# Patient Record
Sex: FEMALE | Race: WHITE | NOT HISPANIC OR LATINO
[De-identification: names, ages, dates, MRNs, and addresses within clinical notes are randomized per-mention and may not be internally consistent; named-entity substitution may affect disease eponyms.]

---

## 2017-12-22 ENCOUNTER — OTHER (OUTPATIENT)
Age: 52
End: 2017-12-22

## 2017-12-29 ENCOUNTER — APPOINTMENT (OUTPATIENT)
Dept: ORTHOPEDIC SURGERY | Facility: CLINIC | Age: 52
End: 2017-12-29
Payer: COMMERCIAL

## 2017-12-29 VITALS
WEIGHT: 190 LBS | HEIGHT: 66 IN | SYSTOLIC BLOOD PRESSURE: 130 MMHG | TEMPERATURE: 98.5 F | BODY MASS INDEX: 30.53 KG/M2 | HEART RATE: 63 BPM | DIASTOLIC BLOOD PRESSURE: 81 MMHG

## 2017-12-29 DIAGNOSIS — Z87.39 PERSONAL HISTORY OF OTHER DISEASES OF THE MUSCULOSKELETAL SYSTEM AND CONNECTIVE TISSUE: ICD-10-CM

## 2017-12-29 PROCEDURE — 99204 OFFICE O/P NEW MOD 45 MIN: CPT

## 2017-12-29 PROCEDURE — 73140 X-RAY EXAM OF FINGER(S): CPT | Mod: F5

## 2018-01-24 ENCOUNTER — APPOINTMENT (OUTPATIENT)
Dept: NEUROLOGY | Facility: CLINIC | Age: 53
End: 2018-01-24
Payer: COMMERCIAL

## 2018-01-24 PROCEDURE — 95886 MUSC TEST DONE W/N TEST COMP: CPT

## 2018-01-24 PROCEDURE — 95909 NRV CNDJ TST 5-6 STUDIES: CPT

## 2018-02-12 ENCOUNTER — OUTPATIENT (OUTPATIENT)
Dept: OUTPATIENT SERVICES | Facility: HOSPITAL | Age: 53
LOS: 1 days | End: 2018-02-12
Payer: COMMERCIAL

## 2018-02-12 VITALS
SYSTOLIC BLOOD PRESSURE: 122 MMHG | DIASTOLIC BLOOD PRESSURE: 72 MMHG | HEIGHT: 66 IN | HEART RATE: 53 BPM | RESPIRATION RATE: 16 BRPM | TEMPERATURE: 98 F | WEIGHT: 196.21 LBS

## 2018-02-12 DIAGNOSIS — Z98.890 OTHER SPECIFIED POSTPROCEDURAL STATES: Chronic | ICD-10-CM

## 2018-02-12 DIAGNOSIS — Z01.818 ENCOUNTER FOR OTHER PREPROCEDURAL EXAMINATION: ICD-10-CM

## 2018-02-12 DIAGNOSIS — G56.01 CARPAL TUNNEL SYNDROME, RIGHT UPPER LIMB: Chronic | ICD-10-CM

## 2018-02-12 DIAGNOSIS — I10 ESSENTIAL (PRIMARY) HYPERTENSION: ICD-10-CM

## 2018-02-12 DIAGNOSIS — G56.02 CARPAL TUNNEL SYNDROME, LEFT UPPER LIMB: ICD-10-CM

## 2018-02-12 DIAGNOSIS — M19.90 UNSPECIFIED OSTEOARTHRITIS, UNSPECIFIED SITE: ICD-10-CM

## 2018-02-12 DIAGNOSIS — Z98.891 HISTORY OF UTERINE SCAR FROM PREVIOUS SURGERY: Chronic | ICD-10-CM

## 2018-02-12 LAB
ANION GAP SERPL CALC-SCNC: 11 MMOL/L — SIGNIFICANT CHANGE UP (ref 5–17)
BASOPHILS # BLD AUTO: 0 K/UL — SIGNIFICANT CHANGE UP (ref 0–0.2)
BASOPHILS NFR BLD AUTO: 0.8 % — SIGNIFICANT CHANGE UP (ref 0–2)
BUN SERPL-MCNC: 15 MG/DL — SIGNIFICANT CHANGE UP (ref 8–20)
CALCIUM SERPL-MCNC: 9.5 MG/DL — SIGNIFICANT CHANGE UP (ref 8.6–10.2)
CHLORIDE SERPL-SCNC: 103 MMOL/L — SIGNIFICANT CHANGE UP (ref 98–107)
CO2 SERPL-SCNC: 28 MMOL/L — SIGNIFICANT CHANGE UP (ref 22–29)
CREAT SERPL-MCNC: 0.77 MG/DL — SIGNIFICANT CHANGE UP (ref 0.5–1.3)
EOSINOPHIL # BLD AUTO: 0.2 K/UL — SIGNIFICANT CHANGE UP (ref 0–0.5)
EOSINOPHIL NFR BLD AUTO: 3.4 % — SIGNIFICANT CHANGE UP (ref 0–6)
GLUCOSE SERPL-MCNC: 106 MG/DL — SIGNIFICANT CHANGE UP (ref 70–115)
HCT VFR BLD CALC: 41.9 % — SIGNIFICANT CHANGE UP (ref 37–47)
HGB BLD-MCNC: 13.5 G/DL — SIGNIFICANT CHANGE UP (ref 12–16)
LYMPHOCYTES # BLD AUTO: 1.6 K/UL — SIGNIFICANT CHANGE UP (ref 1–4.8)
LYMPHOCYTES # BLD AUTO: 25.6 % — SIGNIFICANT CHANGE UP (ref 20–55)
MCHC RBC-ENTMCNC: 27.9 PG — SIGNIFICANT CHANGE UP (ref 27–31)
MCHC RBC-ENTMCNC: 32.2 G/DL — SIGNIFICANT CHANGE UP (ref 32–36)
MCV RBC AUTO: 86.6 FL — SIGNIFICANT CHANGE UP (ref 81–99)
MONOCYTES # BLD AUTO: 0.5 K/UL — SIGNIFICANT CHANGE UP (ref 0–0.8)
MONOCYTES NFR BLD AUTO: 7.3 % — SIGNIFICANT CHANGE UP (ref 3–10)
NEUTROPHILS # BLD AUTO: 4 K/UL — SIGNIFICANT CHANGE UP (ref 1.8–8)
NEUTROPHILS NFR BLD AUTO: 62.7 % — SIGNIFICANT CHANGE UP (ref 37–73)
PLATELET # BLD AUTO: 235 K/UL — SIGNIFICANT CHANGE UP (ref 150–400)
POTASSIUM SERPL-MCNC: 4 MMOL/L — SIGNIFICANT CHANGE UP (ref 3.5–5.3)
POTASSIUM SERPL-SCNC: 4 MMOL/L — SIGNIFICANT CHANGE UP (ref 3.5–5.3)
RBC # BLD: 4.84 M/UL — SIGNIFICANT CHANGE UP (ref 4.4–5.2)
RBC # FLD: 13.2 % — SIGNIFICANT CHANGE UP (ref 11–15.6)
SODIUM SERPL-SCNC: 142 MMOL/L — SIGNIFICANT CHANGE UP (ref 135–145)
WBC # BLD: 6.4 K/UL — SIGNIFICANT CHANGE UP (ref 4.8–10.8)
WBC # FLD AUTO: 6.4 K/UL — SIGNIFICANT CHANGE UP (ref 4.8–10.8)

## 2018-02-12 PROCEDURE — 93005 ELECTROCARDIOGRAM TRACING: CPT

## 2018-02-12 PROCEDURE — 80048 BASIC METABOLIC PNL TOTAL CA: CPT

## 2018-02-12 PROCEDURE — 36415 COLL VENOUS BLD VENIPUNCTURE: CPT

## 2018-02-12 PROCEDURE — G0463: CPT

## 2018-02-12 PROCEDURE — 93010 ELECTROCARDIOGRAM REPORT: CPT

## 2018-02-12 PROCEDURE — 85027 COMPLETE CBC AUTOMATED: CPT

## 2018-02-12 RX ORDER — SODIUM CHLORIDE 9 MG/ML
3 INJECTION INTRAMUSCULAR; INTRAVENOUS; SUBCUTANEOUS ONCE
Qty: 0 | Refills: 0 | Status: DISCONTINUED | OUTPATIENT
Start: 2018-02-21 | End: 2018-03-08

## 2018-02-12 NOTE — H&P PST ADULT - ATTENDING COMMENTS
CKD (chronic kidney disease) stage 2, GFR 60-89 ml/min
Ulcer of right ankle, with necrosis of muscle
Left CTR

## 2018-02-12 NOTE — H&P PST ADULT - PSH
Acute carpal tunnel syndrome of right wrist  release  History of   ,  History of cholecystectomy   Acute carpal tunnel syndrome of right wrist  release  H/O umbilical hernia repair  2017  History of appendectomy    History of arthroscopy  2017  History of   ,  History of cholecystectomy    History of left oophorectomy

## 2018-02-12 NOTE — H&P PST ADULT - ASSESSMENT
Pt is a 52 y.o. female with PMH of HTN undergoing a release left carpal tunnel. Instructed to hold any medications containing ibuprofen and aspirin.

## 2018-02-12 NOTE — H&P PST ADULT - NSANTHOSAYNRD_GEN_A_CORE
No. KENDELL screening performed.  STOP BANG Legend: 0-2 = LOW Risk; 3-4 = INTERMEDIATE Risk; 5-8 = HIGH Risk

## 2018-02-12 NOTE — H&P PST ADULT - FAMILY HISTORY
Mother  Still living? Yes, Estimated age: 61-70  Family history of heart disease, Age at diagnosis: 61-70  Family history of diabetes mellitus in mother, Age at diagnosis: 61-70

## 2018-02-12 NOTE — ASU PATIENT PROFILE, ADULT - LEARNING ASSESSMENT (PATIENT) ADDITIONAL COMMENTS
pre op teaching pain managemewnt instructions given pre op teaching pain management instructions given  surgical scrub not given as pt said she developed a rash the last time she used it.

## 2018-02-12 NOTE — H&P PST ADULT - PMH
Carpal tunnel syndrome on both sides    Gallstones    Hypertension    Osteoarthritis  bilateral hands and knees  Raynaud's phenomenon    Umbilical hernia Carpal tunnel syndrome on both sides    Gallstones    Hypertension    Mitral valve prolapse    Osteoarthritis  bilateral hands and knees  Raynaud's phenomenon    Umbilical hernia

## 2018-02-12 NOTE — ASU PATIENT PROFILE, ADULT - PMH
Carpal tunnel syndrome on both sides    Gallstones    Hypertension    Osteoarthritis  bilateral hands and knees  Raynaud's phenomenon    Umbilical hernia

## 2018-02-15 ENCOUNTER — RX RENEWAL (OUTPATIENT)
Age: 53
End: 2018-02-15

## 2018-02-21 ENCOUNTER — APPOINTMENT (OUTPATIENT)
Dept: ORTHOPEDIC SURGERY | Facility: HOSPITAL | Age: 53
End: 2018-02-21

## 2018-02-21 ENCOUNTER — OUTPATIENT (OUTPATIENT)
Dept: OUTPATIENT SERVICES | Facility: HOSPITAL | Age: 53
LOS: 1 days | End: 2018-02-21
Payer: COMMERCIAL

## 2018-02-21 ENCOUNTER — TRANSCRIPTION ENCOUNTER (OUTPATIENT)
Age: 53
End: 2018-02-21

## 2018-02-21 VITALS
HEART RATE: 51 BPM | TEMPERATURE: 97 F | RESPIRATION RATE: 18 BRPM | OXYGEN SATURATION: 98 % | DIASTOLIC BLOOD PRESSURE: 51 MMHG | SYSTOLIC BLOOD PRESSURE: 112 MMHG

## 2018-02-21 VITALS
HEART RATE: 60 BPM | DIASTOLIC BLOOD PRESSURE: 68 MMHG | SYSTOLIC BLOOD PRESSURE: 127 MMHG | TEMPERATURE: 98 F | RESPIRATION RATE: 16 BRPM | WEIGHT: 192.9 LBS | OXYGEN SATURATION: 100 % | HEIGHT: 66 IN

## 2018-02-21 DIAGNOSIS — Z98.890 OTHER SPECIFIED POSTPROCEDURAL STATES: Chronic | ICD-10-CM

## 2018-02-21 DIAGNOSIS — G56.01 CARPAL TUNNEL SYNDROME, RIGHT UPPER LIMB: Chronic | ICD-10-CM

## 2018-02-21 DIAGNOSIS — G56.02 CARPAL TUNNEL SYNDROME, LEFT UPPER LIMB: ICD-10-CM

## 2018-02-21 DIAGNOSIS — Z98.891 HISTORY OF UTERINE SCAR FROM PREVIOUS SURGERY: Chronic | ICD-10-CM

## 2018-02-21 PROCEDURE — 64721 CARPAL TUNNEL SURGERY: CPT | Mod: LT

## 2018-02-21 RX ORDER — ATENOLOL 25 MG/1
1 TABLET ORAL
Qty: 0 | Refills: 0 | COMMUNITY

## 2018-02-21 RX ORDER — SODIUM CHLORIDE 9 MG/ML
1000 INJECTION, SOLUTION INTRAVENOUS
Qty: 0 | Refills: 0 | Status: DISCONTINUED | OUTPATIENT
Start: 2018-02-21 | End: 2018-02-21

## 2018-02-21 RX ORDER — ONDANSETRON 8 MG/1
4 TABLET, FILM COATED ORAL ONCE
Qty: 0 | Refills: 0 | Status: DISCONTINUED | OUTPATIENT
Start: 2018-02-21 | End: 2018-02-21

## 2018-02-21 RX ORDER — FENTANYL CITRATE 50 UG/ML
50 INJECTION INTRAVENOUS
Qty: 0 | Refills: 0 | Status: DISCONTINUED | OUTPATIENT
Start: 2018-02-21 | End: 2018-02-21

## 2018-02-21 RX ORDER — OXYCODONE HYDROCHLORIDE 5 MG/1
5 TABLET ORAL
Qty: 0 | Refills: 0 | Status: DISCONTINUED | OUTPATIENT
Start: 2018-02-21 | End: 2018-02-21

## 2018-02-21 NOTE — BRIEF OPERATIVE NOTE - PROCEDURE
<<-----Click on this checkbox to enter Procedure Carpal tunnel release, left  02/21/2018    Active  ROLAND

## 2018-03-06 ENCOUNTER — APPOINTMENT (OUTPATIENT)
Dept: ORTHOPEDIC SURGERY | Facility: CLINIC | Age: 53
End: 2018-03-06
Payer: COMMERCIAL

## 2018-03-06 VITALS
DIASTOLIC BLOOD PRESSURE: 71 MMHG | BODY MASS INDEX: 30.53 KG/M2 | HEART RATE: 56 BPM | WEIGHT: 190 LBS | HEIGHT: 66 IN | SYSTOLIC BLOOD PRESSURE: 118 MMHG

## 2018-03-06 DIAGNOSIS — Z98.890 OTHER SPECIFIED POSTPROCEDURAL STATES: ICD-10-CM

## 2018-03-06 DIAGNOSIS — G56.02 CARPAL TUNNEL SYNDROME, LEFT UPPER LIMB: ICD-10-CM

## 2018-03-06 PROCEDURE — 99024 POSTOP FOLLOW-UP VISIT: CPT

## 2018-04-11 ENCOUNTER — APPOINTMENT (OUTPATIENT)
Dept: CARDIOLOGY | Facility: CLINIC | Age: 53
End: 2018-04-11

## 2018-04-18 ENCOUNTER — APPOINTMENT (OUTPATIENT)
Dept: CARDIOLOGY | Facility: CLINIC | Age: 53
End: 2018-04-18
Payer: COMMERCIAL

## 2018-04-18 PROCEDURE — 93306 TTE W/DOPPLER COMPLETE: CPT

## 2018-05-14 ENCOUNTER — RECORD ABSTRACTING (OUTPATIENT)
Age: 53
End: 2018-05-14

## 2018-05-14 RX ORDER — HYDROCHLOROTHIAZIDE 12.5 MG/1
12.5 CAPSULE ORAL
Qty: 90 | Refills: 0 | Status: ACTIVE | COMMUNITY
Start: 2017-07-01

## 2018-05-14 RX ORDER — MULTIVITAMIN
TABLET ORAL
Refills: 0 | Status: ACTIVE | COMMUNITY

## 2018-05-14 RX ORDER — BIOTIN 10 MG
TABLET ORAL
Refills: 0 | Status: ACTIVE | COMMUNITY

## 2018-05-14 RX ORDER — AZITHROMYCIN 500 MG/1
500 TABLET, FILM COATED ORAL
Qty: 5 | Refills: 0 | Status: COMPLETED | COMMUNITY
Start: 2017-11-24 | End: 2018-05-14

## 2018-05-17 ENCOUNTER — APPOINTMENT (OUTPATIENT)
Dept: CARDIOLOGY | Facility: CLINIC | Age: 53
End: 2018-05-17

## 2019-04-16 NOTE — H&P PST ADULT - HISTORY OF PRESENT ILLNESS
No
Pt is a 52 y.o. female with left wrist pain for the past 5 years with worsening symptoms now scheduled for release left carpal tunnel.

## 2019-05-10 ENCOUNTER — MEDICATION RENEWAL (OUTPATIENT)
Age: 54
End: 2019-05-10

## 2019-05-10 ENCOUNTER — RX CHANGE (OUTPATIENT)
Age: 54
End: 2019-05-10

## 2019-05-10 RX ORDER — ATENOLOL 25 MG/1
25 TABLET ORAL DAILY
Qty: 90 | Refills: 0 | Status: ACTIVE | COMMUNITY
Start: 2017-02-08 | End: 1900-01-01

## 2019-05-16 PROBLEM — M19.90 UNSPECIFIED OSTEOARTHRITIS, UNSPECIFIED SITE: Chronic | Status: ACTIVE | Noted: 2018-02-12

## 2019-05-16 PROBLEM — K80.20 CALCULUS OF GALLBLADDER WITHOUT CHOLECYSTITIS WITHOUT OBSTRUCTION: Chronic | Status: ACTIVE | Noted: 2018-02-12

## 2019-05-16 PROBLEM — I34.1 NONRHEUMATIC MITRAL (VALVE) PROLAPSE: Chronic | Status: ACTIVE | Noted: 2018-02-12

## 2019-05-16 PROBLEM — I73.00 RAYNAUD'S SYNDROME WITHOUT GANGRENE: Chronic | Status: ACTIVE | Noted: 2018-02-12

## 2019-05-16 PROBLEM — G56.03 CARPAL TUNNEL SYNDROME, BILATERAL UPPER LIMBS: Chronic | Status: ACTIVE | Noted: 2018-02-12

## 2019-05-16 PROBLEM — I10 ESSENTIAL (PRIMARY) HYPERTENSION: Chronic | Status: ACTIVE | Noted: 2018-02-12

## 2019-05-16 PROBLEM — K42.9 UMBILICAL HERNIA WITHOUT OBSTRUCTION OR GANGRENE: Chronic | Status: ACTIVE | Noted: 2018-02-12

## 2019-07-26 ENCOUNTER — NON-APPOINTMENT (OUTPATIENT)
Age: 54
End: 2019-07-26

## 2019-07-26 ENCOUNTER — APPOINTMENT (OUTPATIENT)
Dept: CARDIOLOGY | Facility: CLINIC | Age: 54
End: 2019-07-26
Payer: COMMERCIAL

## 2019-07-26 VITALS
DIASTOLIC BLOOD PRESSURE: 70 MMHG | OXYGEN SATURATION: 98 % | SYSTOLIC BLOOD PRESSURE: 110 MMHG | BODY MASS INDEX: 27.32 KG/M2 | HEART RATE: 50 BPM | RESPIRATION RATE: 16 BRPM | WEIGHT: 170 LBS | HEIGHT: 66 IN

## 2019-07-26 DIAGNOSIS — M19.041 PRIMARY OSTEOARTHRITIS, RIGHT HAND: ICD-10-CM

## 2019-07-26 DIAGNOSIS — R00.2 PALPITATIONS: ICD-10-CM

## 2019-07-26 DIAGNOSIS — I34.1 NONRHEUMATIC MITRAL (VALVE) PROLAPSE: ICD-10-CM

## 2019-07-26 DIAGNOSIS — I10 ESSENTIAL (PRIMARY) HYPERTENSION: ICD-10-CM

## 2019-07-26 DIAGNOSIS — I73.00 RAYNAUD'S SYNDROME W/OUT GANGRENE: ICD-10-CM

## 2019-07-26 PROCEDURE — 99214 OFFICE O/P EST MOD 30 MIN: CPT

## 2019-07-26 PROCEDURE — 93000 ELECTROCARDIOGRAM COMPLETE: CPT

## 2019-07-26 RX ORDER — HYDROCODONE BITARTRATE AND ACETAMINOPHEN 5; 300 MG/1; MG/1
5-300 TABLET ORAL
Refills: 0 | Status: DISCONTINUED | COMMUNITY
Start: 2018-02-21 | End: 2019-07-26

## 2019-07-26 RX ORDER — ALBUTEROL SULFATE 90 UG/1
108 (90 BASE) AEROSOL, METERED RESPIRATORY (INHALATION)
Refills: 0 | Status: DISCONTINUED | COMMUNITY
Start: 2017-02-16 | End: 2019-07-26

## 2019-07-26 NOTE — REASON FOR VISIT
[FreeTextEntry1] : This is a 54 year old female presenting for cardiac revaluation last seen in December of 2017.\par \par Her medical history includes:\par 1.Palpitations\par 2. Mitral valve prolapse\par 3.HTN\par 4. Raynaud's phenomenon

## 2019-07-26 NOTE — PHYSICAL EXAM
[General Appearance - Well Developed] : well developed [General Appearance - Well Nourished] : well nourished [Normal Conjunctiva] : the conjunctiva exhibited no abnormalities [Normal Oral Mucosa] : normal oral mucosa [] : no respiratory distress [Normal Oropharynx] : normal oropharynx [Respiration, Rhythm And Depth] : normal respiratory rhythm and effort [Heart Rate And Rhythm] : heart rate and rhythm were normal [Bowel Sounds] : normal bowel sounds [Abdomen Soft] : soft [Abnormal Walk] : normal gait [Gait - Sufficient For Exercise Testing] : the gait was sufficient for exercise testing [Cyanosis, Localized] : no localized cyanosis [Nail Clubbing] : no clubbing of the fingernails [Skin Color & Pigmentation] : normal skin color and pigmentation [No Skin Ulcers] : no skin ulcer [Oriented To Time, Place, And Person] : oriented to person, place, and time [No Anxiety] : not feeling anxious

## 2019-07-26 NOTE — HISTORY OF PRESENT ILLNESS
[FreeTextEntry1] : Generally speaking she feels well and continues to work\par \par There are still occasional episodes of palpitations with no associated SOB or chest discomfort. \par \par States that her fit bit watch shows a heart rate of 50, she denies lightheadedness, near syncope/syncope. \par \par Continues to be physically active and walks as her form of exercise, there is no exertional discomfort during this.\par \par Noted 20 lb weight loss which was intentional.\par \par Denies any SOB, edema, PND, chest pain/discomfort.\par \par

## 2019-07-26 NOTE — ASSESSMENT
[FreeTextEntry1] : ECG- Sinus jody at 50 BPM, otherwise normal study\par \par Lab. Data 3/6/19 ordered by Dr. Kamara\par Chol. 143\par LDL     77\par HDL     50\par \par A1C   5.6\par BUN    22\par Creat.  0.65\par HGB   14.2\par \par Echocardiogram 4/18/18\par EF 55-60%\par Trace mitral and pulmonic valve regurgitation\par Mild aortic valve sclerosis without stenosis\par \par \par \par Impression-\par This is a 54 year old female present for cardiac revaluation with no significant changes to her health since our last office visit in 2017. She continues to experience some occ. palpitations but are not associated with any chest discomfort or SOB. She has modified her diet extensively and exercise to help prompt weight loss.\par \par 1. Lipid panel well controlled, currently not on a satin therapy.\par 2. Blood pressure in office today 110/70 and well controlled\par 3. Noted 20 + lb weight loss which she wishes to continue to lose weight.\par 4. Sinus jody on in office ECG with a rate of 50 BPM and asymptomatic.\par \par Plan\par \par -Continue to resume physically active life style and encouraged to gradually incorporate an aggressive resistance exercise regimen to help promote weight loss and prevent any future osteoarthritic issues.\par - Continue to F/U with PCP as scheduled and have all blood work faxed to our office.\par \par \par  \par \par Clinical follow up in  one year

## 2021-03-15 NOTE — ASU PREOP CHECKLIST - WAS PATIENT ON BETA BLOCKER?
Yes Curvilinear Excision Additional Text (Leave Blank If You Do Not Want): The margin was drawn around the clinically apparent lesion.  A curvilinear shape was then drawn on the skin incorporating the lesion and margins.  Incisions were then made along these lines to the appropriate tissue plane and the lesion was extirpated.

## 2021-10-21 ENCOUNTER — APPOINTMENT (OUTPATIENT)
Dept: CARDIOLOGY | Facility: CLINIC | Age: 56
End: 2021-10-21

## 2022-08-15 ENCOUNTER — ASOB RESULT (OUTPATIENT)
Age: 57
End: 2022-08-15

## 2022-08-15 ENCOUNTER — APPOINTMENT (OUTPATIENT)
Dept: OBGYN | Facility: CLINIC | Age: 57
End: 2022-08-15

## 2022-08-15 VITALS
BODY MASS INDEX: 33.59 KG/M2 | HEART RATE: 67 BPM | HEIGHT: 66 IN | SYSTOLIC BLOOD PRESSURE: 144 MMHG | WEIGHT: 209 LBS | DIASTOLIC BLOOD PRESSURE: 78 MMHG

## 2022-08-15 DIAGNOSIS — N95.0 POSTMENOPAUSAL BLEEDING: ICD-10-CM

## 2022-08-15 DIAGNOSIS — U07.1 COVID-19: ICD-10-CM

## 2022-08-15 PROCEDURE — 76856 US EXAM PELVIC COMPLETE: CPT

## 2022-08-15 PROCEDURE — 76830 TRANSVAGINAL US NON-OB: CPT

## 2022-08-15 PROCEDURE — 99396 PREV VISIT EST AGE 40-64: CPT | Mod: 25

## 2022-08-15 NOTE — PLAN
[FreeTextEntry1] : Patient is a 57-year-old  5 para 2-0-1-2 last menstrual period age 51 patient presents for annual visit complaining of some right pelvic pain had a sonogram back in July that showed no findings to the etiology of the pain\par Patient also states that he had 1 episode of spotting after the July sonogram\par Physical exam reveals a well-developed well-nourished female in no apparent distress,,,, BMI 33\par Heart regular rhythm and rate, lungs clear, breast no mass nontender no skin change no nipple discharge no adenopathy, abdomen soft nontender no organomegaly.\par Pelvic exam shows normal female atrophic vulva, vagina no lesions atrophic, cervix flush to the vaginal vault nontender, uterus anteverted small nontender, adnexa no mass nontender.\par Pap smear performed\par Pelvic sonogram\par Uterus measures 7.0 x 4.6 x 3.5,,, endometrium is 3 mm\par Right ovary 1.3 x 1.2 x 0.6\par Left ovary absent\par Benign GYN anatomy\par Results discussed with patient\par Cyst endometrium is measuring only 3 mm and there was only a few hours of some light spotting for 1 day,,, decision not to pursue any Caren biopsy or sampling at this point is indicated\par Patient advised to return immediately if any bleeding recurs\par As for the pelvic pain patient advised to consider having a CAT scan done to further elucidate a possible etiology for the discomfort patient states he understands

## 2022-08-15 NOTE — PHYSICAL EXAM
[Chaperone Present] : A chaperone was present in the examining room during all aspects of the physical examination [Appropriately responsive] : appropriately responsive [Alert] : alert [No Acute Distress] : no acute distress [No Lymphadenopathy] : no lymphadenopathy [Regular Rate Rhythm] : regular rate rhythm [No Murmurs] : no murmurs [Clear to Auscultation B/L] : clear to auscultation bilaterally [Soft] : soft [Non-tender] : non-tender [Non-distended] : non-distended [No HSM] : No HSM [No Lesions] : no lesions [No Mass] : no mass [Oriented x3] : oriented x3 [Examination Of The Breasts] : a normal appearance [No Masses] : no breast masses were palpable [Vulvar Atrophy] : vulvar atrophy [Labia Majora] : normal [Labia Minora] : normal [Atrophy] : atrophy [Normal] : normal [Anteversion] : anteverted [Uterine Adnexae] : normal [FreeTextEntry6] : No masses, nontender, no skin changes, no nipple discharge, no adenopathy. [Tenderness] : nontender [Mass ___ cm] : no uterine mass was palpated

## 2022-08-15 NOTE — HISTORY OF PRESENT ILLNESS
[FreeTextEntry1] : Patient is a 57-year-old  5 para 2-0-1-2 last menstrual period age 51\par Patient presents for annual visit complaining of some right pelvic pain over the past couple months and also 1 episode of spotting  of this year for 1 day for a few minutes\par

## 2022-08-23 LAB — HPV HIGH+LOW RISK DNA PNL CVX: NOT DETECTED

## 2022-10-02 ENCOUNTER — APPOINTMENT (OUTPATIENT)
Dept: ORTHOPEDIC SURGERY | Facility: CLINIC | Age: 57
End: 2022-10-02

## 2022-10-02 DIAGNOSIS — M70.52 OTHER BURSITIS OF KNEE, LEFT KNEE: ICD-10-CM

## 2022-10-02 PROCEDURE — 99204 OFFICE O/P NEW MOD 45 MIN: CPT | Mod: 25

## 2022-10-02 PROCEDURE — L1832: CPT | Mod: LT

## 2022-10-02 PROCEDURE — 20610 DRAIN/INJ JOINT/BURSA W/O US: CPT | Mod: LT

## 2022-10-02 PROCEDURE — 73562 X-RAY EXAM OF KNEE 3: CPT | Mod: LT

## 2022-10-02 NOTE — HISTORY OF PRESENT ILLNESS
[10] : 10 [1] : 2 [Sharp] : sharp [Shooting] : shooting [de-identified] : 10/2/2022: Pt with left knee pain x 1 week. Pt received a steroid injection to her left deltoid this past Friday (with her PCP / boss)  and she noted immediate pain relief. however yesterday she was placing her grandaughter and she felt sharp "tearing" pain to the posterior knee.\par Pt complains of intermittent buckling.\par Pt has had previous previous partial lateral meniscectomy and medial femoral chondroplasty and partial synovectomy at [facility] [place] in 2017 with Dr. Johnnie Darling. \par \par PMH: HTN.\par Allergies: NKDA.  [] : no [FreeTextEntry5] : history of knee pain, pt felt pain in the knee all week and felt worse Friday, after injection from PCP she felt better. Pt states she was helping buckle her daughter on Saturday and felt a tear in the back of the knee [de-identified] : pcp

## 2022-10-02 NOTE — IMAGING
[Left] : left knee [All Views] : anteroposterior, lateral, skyline, and anteroposterior standing [de-identified] : Left knee with no significant swelling/effusion.\par +ttp over the pes bursa and pain is noted with PTF compression.\par Apprehension Sign is negative. \par There is no ligamentous laxity noted to the left knee.\par +ttp over the pes bursa.\par Spring/Kalpesh and Apley Compression are negative. \par Left hip with full and painfree ROM. \par Gait is mildly antalgic to the left.\par LLE is nvi with 5/5 strength.  [FreeTextEntry9] : left knee with PTF moderate OA, no other acute bony pathology is noted.

## 2022-10-02 NOTE — PROCEDURE
[Large Joint Injection] : Large joint injection [Right] : of the right [Knee] : knee [Pain] : pain [Inflammation] : inflammation [X-ray evidence of Osteoarthritis on this or prior visit] : x-ray evidence of Osteoarthritis on this or prior visit [Alcohol] : alcohol [Betadine] : betadine [Ethyl Chloride sprayed topically] : ethyl chloride sprayed topically [Sterile technique used] : sterile technique used [___ cc    3mg] :  Betamethasone (Celestone) ~Vcc of 3mg [___ cc    1%] : Lidocaine ~Vcc of 1%  [___ cc    0.25%] : Bupivacaine (Marcaine) ~Vcc of 0.25%  [___ cc    40mg] : Triamcinolone (Kenalog) ~Vcc of 40 mg  [Call if redness, pain or fever occur] : call if redness, pain or fever occur [Apply ice for 15min out of every hour for the next 12-24 hours as tolerated] : apply ice for 15 minutes out of every hour for the next 12-24 hours as tolerated [Previous OTC use and PT nontherapeutic] : patient has tried OTC's including aspirin, Ibuprofen, Aleve, etc or prescription NSAIDS, and/or exercises at home and/or physical therapy without satisfactory response [Patient had decreased mobility in the joint] : patient had decreased mobility in the joint [Risks, benefits, alternatives discussed / Verbal consent obtained] : the risks benefits, and alternatives have been discussed, and verbal consent was obtained [de-identified] : right knee

## 2022-10-02 NOTE — ASSESSMENT
[FreeTextEntry1] : Pt provided left knee and pes bursa CSI today.\par Continue Aleve bid x 7 days.\par Ice compress qid x 3 days.\par RTO in 1 week \par Open patella knee brace provided today.

## 2022-10-10 ENCOUNTER — APPOINTMENT (OUTPATIENT)
Dept: ORTHOPEDIC SURGERY | Facility: CLINIC | Age: 57
End: 2022-10-10

## 2022-10-10 VITALS — BODY MASS INDEX: 33.59 KG/M2 | HEIGHT: 66 IN | WEIGHT: 209 LBS

## 2022-10-10 DIAGNOSIS — Z98.890 OTHER SPECIFIED POSTPROCEDURAL STATES: ICD-10-CM

## 2022-10-10 DIAGNOSIS — M23.92 UNSPECIFIED INTERNAL DERANGEMENT OF LEFT KNEE: ICD-10-CM

## 2022-10-10 PROCEDURE — 99214 OFFICE O/P EST MOD 30 MIN: CPT

## 2022-10-10 NOTE — IMAGING
[de-identified] : Mild effusion, no warmth, no ecchymosis\par Medial joint line and posterior tenderness to palpation\par Range of motion 0-130\par 5/5 quadriceps and hamstring strength\par Positive Kalpesh\par No varus or valgus instability, negative lachman\par Motor and sensory intact distally\par Mildly antalgic gait\par

## 2022-10-10 NOTE — HISTORY OF PRESENT ILLNESS
[Left Leg] : left leg [Injection therapy] : injection therapy [Walking] : walking [8] : 8 [Dull/Aching] : dull/aching [Throbbing] : throbbing [Tightness] : tightness [Constant] : constant [Ice] : ice [Stairs] : stairs [de-identified] : Jeronimo MONSON Urgent Care notes:\par 10/10/2022  Pt with left knee pain x 1 week. Pt received a steroid injection to her left deltoid this past Friday (with her PCP / boss) and she noted immediate pain relief. however yesterday she was placing her grandaughter and she felt sharp "tearing" pain to the posterior knee.\par Pt complains of intermittent buckling.\par Pt has had previous previous partial lateral meniscectomy and medial femoral chondroplasty and partial synovectomy at [facility] [place] in [place] with Dr. Johnnie Darling. \par \par Dr. Fitzpatrick's Notes:\par 10/10/2022: 57 year old female patient present with pain in the LT knee for the past two weeks.  Pain behind the LT knee.  Pt was seen by Jeronimo MONSON at urgent care, and was Dx with a OA and a pes anserinus bursitis of left knee.  Pt received a CSI to the RT knee, which provided relief for a few days and improved her ability to walk.  On today's visit the patient's pain has mostly returned, and she describes the pain as a throbbing sensation when walking.  The patient is currently taking Aleve and occasionally uses ice for pain relief.\par \par \par  [] : no [FreeTextEntry1] : Left knee [FreeTextEntry7] : behind the knee, down the shin  [de-identified] : physical activity [de-identified] : Jeronimo Moses RPA [de-identified] : X-[employer]ays [employer] [de-identified] : cortisone injection

## 2022-10-10 NOTE — ASSESSMENT
[FreeTextEntry1] : PERSISTENT POSTERIOR PAIN AFTER TRAUMATIC INJURY, PRIOR SCOPE FOR MENISCUS AND DID WELL\par I SUSPECT A POSTERIOR MENISCAL ROOT INJURY, ADVISED MRI TO EVAL\par SET UP ORTHOVISC, STILL IN PAIN AFTER CSI AT OUR URGENT CARE

## 2022-10-19 ENCOUNTER — RESULT REVIEW (OUTPATIENT)
Age: 57
End: 2022-10-19

## 2022-10-21 RX ORDER — HYALURONATE SODIUM 20 MG/2 ML
20 SYRINGE (ML) INTRAARTICULAR
Qty: 3 | Refills: 0 | Status: ACTIVE | COMMUNITY
Start: 2022-10-21 | End: 1900-01-01

## 2022-10-24 ENCOUNTER — APPOINTMENT (OUTPATIENT)
Dept: ORTHOPEDIC SURGERY | Facility: CLINIC | Age: 57
End: 2022-10-24

## 2022-10-24 PROCEDURE — 99214 OFFICE O/P EST MOD 30 MIN: CPT

## 2022-10-24 NOTE — IMAGING
[de-identified] : Mild effusion, no warmth, no ecchymosis\par Medial joint line and posterior tenderness to palpation\par Range of motion 0-130\par 5/5 quadriceps and hamstring strength\par Positive Kalpesh\par No varus or valgus instability, negative lachman\par Motor and sensory intact distally\par Mildly antalgic gait\par

## 2022-10-24 NOTE — ASSESSMENT
[FreeTextEntry1] : REVIEWED MRI, AS I SUSPECTED OA PROGRESSION WITH MENISCAL ROOT AVULSION\par START VISCO ONCE APPROVED\par WEIGHT LOSS, NSAIDS PRN\par CAN CONSIDER DEBRIDEMENT OF THE ROOT IF ALL ELSE FAILS BUT EXPLAINED NO GUARANTEES\par

## 2022-11-11 ENCOUNTER — APPOINTMENT (OUTPATIENT)
Dept: ORTHOPEDIC SURGERY | Facility: CLINIC | Age: 57
End: 2022-11-11

## 2022-11-11 VITALS — HEIGHT: 66 IN | WEIGHT: 209 LBS | BODY MASS INDEX: 33.59 KG/M2

## 2022-11-11 DIAGNOSIS — M23.307 OTHER MENISCUS DERANGEMENTS, UNSPECIFIED MENISCUS, LEFT KNEE: ICD-10-CM

## 2022-11-11 DIAGNOSIS — E66.01 MORBID (SEVERE) OBESITY DUE TO EXCESS CALORIES: ICD-10-CM

## 2022-11-11 PROCEDURE — 20611 DRAIN/INJ JOINT/BURSA W/US: CPT | Mod: LT

## 2022-11-11 PROCEDURE — 99213 OFFICE O/P EST LOW 20 MIN: CPT | Mod: 25

## 2022-11-11 NOTE — PROCEDURE
[FreeTextEntry3] : Large joint injection was performed of the LEFT knee. The indication for this procedure was pain and inflammation. The site was prepped with alcohol, betadine, ethyl chloride sprayed topically and sterile technique used. An injection of EUFLEXXA 2 ML series #1 was used.\par \par Patient tolerated procedure well. Patient was advised to call if redness, pain of fever occur, apply ice for 15 minutes out of every hours for the next 12-24 hours as tolerated and patient was advised to rest the joint(s) for 2 days.\par \par Patient has tried OTC's including aspirin, ibuprofen, Aleve, etc or prescription NSAIDs, and/or exercises at home and/or physical therapy without satisfactory response, patient had decreased mobility in the joint and the risks, benefits and alternatives have been discussed, and verbal consent was obtained.\par \par Ultrasound guidance was indicated for this patient due to morbid obesity/difficult injection. All ultrasound images have been permanently captured and stored accordingly in our picture archiving and communication system. Visualization of the needle and placement of injection was performed without complication.\par

## 2022-11-11 NOTE — ASSESSMENT
[FreeTextEntry1] : EUFLEXXA R/B/A DISUCSSED\par EUFLEXXA #1 LEFT KNEE, TOLERATED WELL\par RTO 1 WEEK TO CONTINUE SERIES

## 2022-11-11 NOTE — IMAGING
[de-identified] : Mild effusion, no warmth, no ecchymosis\par Medial joint line and posterior tenderness to palpation persists\par Range of motion 0-130\par \par

## 2022-11-11 NOTE — HISTORY OF PRESENT ILLNESS
[Left Leg] : left leg [8] : 8 [6] : 6 [Dull/Aching] : dull/aching [Tightness] : tightness [Constant] : constant [Household chores] : household chores [Leisure] : leisure [Work] : work [Sleep] : sleep [Ice] : ice [Injection therapy] : injection therapy [Walking] : walking [Stairs] : stairs [de-identified] : Jeronimo MONSON Urgent Care notes:\par 10/10/2022  Pt with left knee pain x 1 week. Pt received a steroid injection to her left deltoid this past Friday (with her PCP / boss) and she noted immediate pain relief. however yesterday she was placing her grandaughter and she felt sharp "tearing" pain to the posterior knee.\par Pt complains of intermittent buckling.\par Pt has had previous previous partial lateral meniscectomy and medial femoral [place]ro[place] [facility] [place] Johnnie Dar[place]. \par \par Dr. Fitzpatrick's Notes:\par 10/10/2022: 57 year old female patient present with pain in the LT knee for the past two weeks.  Pain behind the LT knee.  Pt was seen by Jeronimo MONSON at urgent care, and was Dx with a OA and a pes anserinus bursitis of left knee.  Pt received a CSI to the RT knee, which provided relief for a few days and improved her ability to walk.  On today's visit the patient's pain has mostly returned, and she describes the pain as a throbbing sensation when walking.  The patient is currently taking Aleve and occasionally uses ice for pain relief.\par 11/11/22: FOLLOW UP LEFT KNEE, STARTING EUFLEXXA [] : no [FreeTextEntry1] : Left knee [FreeTextEntry7] : behind the knee, down the shin  [de-identified] : physical activity [de-identified] : Jeronimo Moses RPA [de-identified] : [employer] [de-identified] : cortisone injection

## 2022-11-18 ENCOUNTER — APPOINTMENT (OUTPATIENT)
Dept: ORTHOPEDIC SURGERY | Facility: CLINIC | Age: 57
End: 2022-11-18

## 2022-11-18 VITALS — WEIGHT: 209 LBS | BODY MASS INDEX: 33.59 KG/M2 | HEIGHT: 66 IN

## 2022-11-18 PROCEDURE — 99212 OFFICE O/P EST SF 10 MIN: CPT | Mod: 25

## 2022-11-18 PROCEDURE — 20610 DRAIN/INJ JOINT/BURSA W/O US: CPT | Mod: LT

## 2022-11-18 NOTE — PROCEDURE
[FreeTextEntry3] : Large joint injection was performed of the LEFT knee. The indication for this procedure was pain and inflammation. The site was prepped with alcohol, betadine, ethyl chloride sprayed topically and sterile technique used. An injection of EUFLEXXA 2ml  series #2 was used.\par \par Patient tolerated procedure well. Patient was advised to call if redness, pain of fever occur, apply ice for 15 minutes out of every hours for the next 12-24 hours as tolerated and patient was advised to rest the joint(s) for 2 days.\par \par Patient has tried OTC's including aspirin, ibuprofen, Aleve, etc or prescription NSAIDs, and/or exercises at home and/or physical therapy without satisfactory response, patient had decreased mobility in the joint and the risks, benefits and alternatives have been discussed, and verbal consent was obtained.\par \par Ultrasound guidance was indicated for this patient due to morbid obesity/difficult injection. All ultrasound images have been permanently captured and stored accordingly in our picture archiving and communication system. Visualization of the needle and placement of injection was performed without complication.\par

## 2022-11-18 NOTE — IMAGING
[de-identified] : Mild effusion, no warmth, no ecchymosis\par Medial joint line and posterior tenderness to palpation persists\par Range of motion 0-130\par \par

## 2022-11-18 NOTE — HISTORY OF PRESENT ILLNESS
[Left Leg] : left leg [8] : 8 [6] : 6 [Dull/Aching] : dull/aching [Tightness] : tightness [Constant] : constant [Household chores] : household chores [Leisure] : leisure [Work] : work [Sleep] : sleep [Ice] : ice [Injection therapy] : injection therapy [Walking] : walking [Stairs] : stairs [2] : 2 [Euflexxa] : Euflexxa [] : no [FreeTextEntry1] : Left knee [FreeTextEntry7] : behind the knee, down the shin  [de-identified] : physical activity [de-identified] : Alice  [de-identified] : [employer] [de-identified] : cortisone injection [de-identified] : 11/11/22 [de-identified] : Left knee

## 2022-11-28 ENCOUNTER — APPOINTMENT (OUTPATIENT)
Dept: ORTHOPEDIC SURGERY | Facility: CLINIC | Age: 57
End: 2022-11-28

## 2022-11-28 DIAGNOSIS — M17.12 UNILATERAL PRIMARY OSTEOARTHRITIS, LEFT KNEE: ICD-10-CM

## 2022-11-28 PROCEDURE — 20611 DRAIN/INJ JOINT/BURSA W/US: CPT | Mod: LT

## 2022-11-28 PROCEDURE — 99212 OFFICE O/P EST SF 10 MIN: CPT | Mod: 25

## 2022-11-28 NOTE — IMAGING
[de-identified] : Mild effusion, no warmth, no ecchymosis\par Medial joint line and posterior tenderness to palpation persists\par Range of motion 0-130\par \par

## 2022-11-28 NOTE — HISTORY OF PRESENT ILLNESS
[Left Leg] : left leg [8] : 8 [6] : 6 [Dull/Aching] : dull/aching [Tightness] : tightness [Constant] : constant [Household chores] : household chores [Leisure] : leisure [Work] : work [Sleep] : sleep [Ice] : ice [Injection therapy] : injection therapy [Walking] : walking [Stairs] : stairs [2] : 2 [Euflexxa] : Euflexxa [de-identified] : 11/28/22 here for euflexxa left knee # 3  [] : no [FreeTextEntry1] : Left knee [FreeTextEntry7] : behind the knee, down the shin  [de-identified] : physical activity [de-identified] : Alice  [de-identified] : [employer] [de-identified] : cortisone injection\par euflexxa injections [de-identified] : 11/11/22 [de-identified] : Left knee

## 2022-11-28 NOTE — PROCEDURE
[FreeTextEntry3] : Large joint injection was performed of the LEFT knee. The indication for this procedure was pain and inflammation. The site was prepped with alcohol, betadine, ethyl chloride sprayed topically and sterile technique used. An injection of EUFLEXXA 2ml  series #3 was used.\par \par Patient tolerated procedure well. Patient was advised to call if redness, pain of fever occur, apply ice for 15 minutes out of every hours for the next 12-24 hours as tolerated and patient was advised to rest the joint(s) for 2 days.\par \par Patient has tried OTC's including aspirin, ibuprofen, Aleve, etc or prescription NSAIDs, and/or exercises at home and/or physical therapy without satisfactory response, patient had decreased mobility in the joint and the risks, benefits and alternatives have been discussed, and verbal consent was obtained.\par \par Ultrasound guidance was indicated for this patient due to morbid obesity/difficult injection. All ultrasound images have been permanently captured and stored accordingly in our picture archiving and communication system. Visualization of the needle and placement of injection was performed without complication.\par

## 2023-01-10 DIAGNOSIS — Z00.00 ENCOUNTER FOR GENERAL ADULT MEDICAL EXAMINATION W/OUT ABNORMAL FINDINGS: ICD-10-CM

## 2023-04-24 ENCOUNTER — APPOINTMENT (OUTPATIENT)
Dept: ORTHOPEDIC SURGERY | Facility: CLINIC | Age: 58
End: 2023-04-24

## 2023-08-21 ENCOUNTER — APPOINTMENT (OUTPATIENT)
Dept: OBGYN | Facility: CLINIC | Age: 58
End: 2023-08-21
Payer: COMMERCIAL

## 2023-08-21 VITALS
WEIGHT: 200 LBS | DIASTOLIC BLOOD PRESSURE: 91 MMHG | HEART RATE: 74 BPM | SYSTOLIC BLOOD PRESSURE: 139 MMHG | BODY MASS INDEX: 32.14 KG/M2 | HEIGHT: 66 IN

## 2023-08-21 DIAGNOSIS — M79.641 PAIN IN RIGHT HAND: ICD-10-CM

## 2023-08-21 PROCEDURE — 99396 PREV VISIT EST AGE 40-64: CPT

## 2023-08-21 NOTE — HISTORY OF PRESENT ILLNESS
[FreeTextEntry1] : Patient is a 58-year-old  3 para 2-0-1-2 last menstrual period approximate 7 years prior Patient presents for annual visit,, denies any complaints

## 2023-08-21 NOTE — PLAN
[FreeTextEntry1] : Patient is a 58-year-old  3 para 2-0-1-2 last menstrual period approximate 7 years prior Patient presents for annual visit,, denies any complaints Physical exam reveals a well-developed well-nourished female in no apparent distress,, BMI 32 Heart regular rhythm and rate, lungs clear, breast no mass nontender no skin changes or nipple discharge no adenopathy, abdomen soft nontender no organomegaly. Pelvic exam shows normal female external genitalia, atrophic, vagina no lesions atrophic, cervix appropriate size nontender, uterus anteverted normal size nontender, adnexa no mass nontender. Pap smear is performed Patient had recent breast mammogram and sonogram done on  of this year with negative findings Essentially benign joint exam Follow-up 1 year or prior to that as needed   Parent was present as a chaperone for the entire assessment examination of this patient

## 2023-08-25 DIAGNOSIS — M67.441 GANGLION, RIGHT HAND: ICD-10-CM

## 2023-08-25 LAB — HPV HIGH+LOW RISK DNA PNL CVX: NOT DETECTED

## 2023-12-07 ENCOUNTER — APPOINTMENT (OUTPATIENT)
Dept: OBGYN | Facility: CLINIC | Age: 58
End: 2023-12-07
Payer: COMMERCIAL

## 2023-12-07 VITALS
BODY MASS INDEX: 33.75 KG/M2 | HEIGHT: 66 IN | SYSTOLIC BLOOD PRESSURE: 170 MMHG | WEIGHT: 210 LBS | DIASTOLIC BLOOD PRESSURE: 95 MMHG | HEART RATE: 75 BPM

## 2023-12-07 DIAGNOSIS — Z12.4 ENCOUNTER FOR SCREENING FOR MALIGNANT NEOPLASM OF CERVIX: ICD-10-CM

## 2023-12-07 DIAGNOSIS — Z92.89 PERSONAL HISTORY OF OTHER MEDICAL TREATMENT: ICD-10-CM

## 2023-12-07 DIAGNOSIS — M79.644 PAIN IN RIGHT FINGER(S): ICD-10-CM

## 2023-12-07 DIAGNOSIS — Z12.39 ENCOUNTER FOR OTHER SCREENING FOR MALIGNANT NEOPLASM OF BREAST: ICD-10-CM

## 2023-12-07 DIAGNOSIS — Z11.51 ENCOUNTER FOR SCREENING FOR HUMAN PAPILLOMAVIRUS (HPV): ICD-10-CM

## 2023-12-07 DIAGNOSIS — R10.2 PELVIC AND PERINEAL PAIN: ICD-10-CM

## 2023-12-07 DIAGNOSIS — N93.9 ABNORMAL UTERINE AND VAGINAL BLEEDING, UNSPECIFIED: ICD-10-CM

## 2023-12-07 DIAGNOSIS — Z01.419 ENCOUNTER FOR GYNECOLOGICAL EXAMINATION (GENERAL) (ROUTINE) W/OUT ABNORMAL FINDINGS: ICD-10-CM

## 2023-12-07 PROCEDURE — 99213 OFFICE O/P EST LOW 20 MIN: CPT

## 2023-12-12 ENCOUNTER — ASOB RESULT (OUTPATIENT)
Age: 58
End: 2023-12-12

## 2023-12-12 ENCOUNTER — APPOINTMENT (OUTPATIENT)
Dept: OBGYN | Facility: CLINIC | Age: 58
End: 2023-12-12
Payer: COMMERCIAL

## 2023-12-12 VITALS
BODY MASS INDEX: 33.9 KG/M2 | SYSTOLIC BLOOD PRESSURE: 168 MMHG | HEART RATE: 88 BPM | DIASTOLIC BLOOD PRESSURE: 99 MMHG | WEIGHT: 210 LBS

## 2023-12-12 LAB — HCG UR QL: NEGATIVE

## 2023-12-12 PROCEDURE — 76856 US EXAM PELVIC COMPLETE: CPT | Mod: 59

## 2023-12-12 PROCEDURE — 58100 BIOPSY OF UTERUS LINING: CPT

## 2023-12-12 PROCEDURE — 81025 URINE PREGNANCY TEST: CPT

## 2023-12-12 PROCEDURE — 76830 TRANSVAGINAL US NON-OB: CPT

## 2023-12-18 LAB — CORE LAB BIOPSY: NORMAL

## 2023-12-27 ENCOUNTER — APPOINTMENT (OUTPATIENT)
Dept: OBGYN | Facility: CLINIC | Age: 58
End: 2023-12-27
Payer: COMMERCIAL

## 2023-12-27 VITALS — SYSTOLIC BLOOD PRESSURE: 165 MMHG | DIASTOLIC BLOOD PRESSURE: 101 MMHG | HEART RATE: 75 BPM

## 2023-12-27 VITALS — BODY MASS INDEX: 33.9 KG/M2 | WEIGHT: 210 LBS

## 2023-12-27 PROCEDURE — 99212 OFFICE O/P EST SF 10 MIN: CPT

## 2023-12-27 NOTE — HISTORY OF PRESENT ILLNESS
[FreeTextEntry1] : Patient is a 58-year-old  3 para 2-0-1-2 last menstrual period thousand 16 Patient presents for follow-up after undergoing endometrial biopsy for postmenopausal bleeding

## 2023-12-27 NOTE — PLAN
[FreeTextEntry1] : Patient is a 58-year-old  3 para 2-0-1-2 last menstrual period  Patient presents for follow-up after undergoing endometrial biopsy for postmenopausal bleeding Biopsy result shows benign inactive endometrium Results of benign pathology Results discussed with patient Reassured At this point there is no indication for any medical therapy and definitely no surgical therapy,, if however there is any further bleeding patient is advised that she may require hysterectomy for unexplained postmenopausal bleeding Questions answered Patient that she understands Follow-up 6 months  Patient was seen in the office consultation for discussion and not examined during this visit

## 2024-06-28 ENCOUNTER — APPOINTMENT (OUTPATIENT)
Dept: OBGYN | Facility: CLINIC | Age: 59
End: 2024-06-28
Payer: COMMERCIAL

## 2024-06-28 VITALS
BODY MASS INDEX: 32.78 KG/M2 | HEART RATE: 76 BPM | WEIGHT: 204 LBS | HEIGHT: 66 IN | SYSTOLIC BLOOD PRESSURE: 114 MMHG | DIASTOLIC BLOOD PRESSURE: 79 MMHG

## 2024-06-28 DIAGNOSIS — Z01.419 ENCOUNTER FOR GYNECOLOGICAL EXAMINATION (GENERAL) (ROUTINE) W/OUT ABNORMAL FINDINGS: ICD-10-CM

## 2024-06-28 DIAGNOSIS — Z98.890 OTHER SPECIFIED POSTPROCEDURAL STATES: ICD-10-CM

## 2024-06-28 DIAGNOSIS — Z71.9 COUNSELING, UNSPECIFIED: ICD-10-CM

## 2024-06-28 PROCEDURE — 99213 OFFICE O/P EST LOW 20 MIN: CPT | Mod: 25

## 2024-06-28 PROCEDURE — 99459 PELVIC EXAMINATION: CPT

## 2024-07-11 ENCOUNTER — NON-APPOINTMENT (OUTPATIENT)
Age: 59
End: 2024-07-11

## 2024-08-30 ENCOUNTER — APPOINTMENT (OUTPATIENT)
Dept: OBGYN | Facility: CLINIC | Age: 59
End: 2024-08-30
Payer: COMMERCIAL

## 2024-08-30 VITALS
HEIGHT: 66 IN | DIASTOLIC BLOOD PRESSURE: 84 MMHG | BODY MASS INDEX: 31.02 KG/M2 | HEART RATE: 72 BPM | SYSTOLIC BLOOD PRESSURE: 125 MMHG | WEIGHT: 193 LBS

## 2024-08-30 DIAGNOSIS — Z12.4 ENCOUNTER FOR SCREENING FOR MALIGNANT NEOPLASM OF CERVIX: ICD-10-CM

## 2024-08-30 DIAGNOSIS — Z01.419 ENCOUNTER FOR GYNECOLOGICAL EXAMINATION (GENERAL) (ROUTINE) W/OUT ABNORMAL FINDINGS: ICD-10-CM

## 2024-08-30 DIAGNOSIS — Z12.39 ENCOUNTER FOR OTHER SCREENING FOR MALIGNANT NEOPLASM OF BREAST: ICD-10-CM

## 2024-08-30 DIAGNOSIS — N60.01 SOLITARY CYST OF RIGHT BREAST: ICD-10-CM

## 2024-08-30 DIAGNOSIS — Z12.31 ENCOUNTER FOR SCREENING MAMMOGRAM FOR MALIGNANT NEOPLASM OF BREAST: ICD-10-CM

## 2024-08-30 DIAGNOSIS — Z11.51 ENCOUNTER FOR SCREENING FOR HUMAN PAPILLOMAVIRUS (HPV): ICD-10-CM

## 2024-08-30 DIAGNOSIS — Z98.890 OTHER SPECIFIED POSTPROCEDURAL STATES: ICD-10-CM

## 2024-08-30 PROCEDURE — 99459 PELVIC EXAMINATION: CPT

## 2024-08-30 PROCEDURE — 99396 PREV VISIT EST AGE 40-64: CPT

## 2024-08-30 NOTE — PLAN
[FreeTextEntry1] : Patient is a 59-year-old  3 para 2-0-1-2 last menstrual period  Patient presents for annual visit,, denies any complaints Physical exam reveals a well-developed well-nourished female no apparent distress,, BMI 31 Heart regular rhythm and rate, lungs clear, breast no mass nontender no skin change or nipple discharge adenopathy, abdomen soft nontender no organomegaly Pelvic exam shows normal female external genitalia atrophic, vagina no lesions atrophic, cervix appropriate size mobile nontender, uterus anteverted mobile small nontender, adnexa no mass nontender Pap smear was performed Patient had a mammogram performed in July of this year with negative findings Patient had a negative Cologuard testing Essentially benign GYN exam Follow-up 1 year or prior to that as needed  Bhargav was present as a chaperone for the entire assessment and examination of this patient

## 2024-08-30 NOTE — HISTORY OF PRESENT ILLNESS
[FreeTextEntry1] : Patient is a 59-year-old  3 para 2-0-1-2 last menstrual period  Patient presents for annual visit,, denies any complaints

## 2024-08-30 NOTE — PHYSICAL EXAM
[Chaperone Present] : A chaperone was present in the examining room during all aspects of the physical examination [[id number]] : A chaperone was present during the pelvic exam. [FreeTextEntry2] : Bhargav [Appropriately responsive] : appropriately responsive [Alert] : alert [No Acute Distress] : no acute distress [No Lymphadenopathy] : no lymphadenopathy [Regular Rate Rhythm] : regular rate rhythm [No Murmurs] : no murmurs [Soft] : soft [Non-tender] : non-tender [Non-distended] : non-distended [No HSM] : No HSM [No Lesions] : no lesions [No Mass] : no mass [Oriented x3] : oriented x3 [FreeTextEntry6] : No masses, nontender, no skin changes, no nipple discharge, no adenopathy. [Examination Of The Breasts] : a normal appearance [No Masses] : no breast masses were palpable [Vulvar Atrophy] : vulvar atrophy [Labia Majora] : normal [Labia Minora] : normal [Atrophy] : atrophy [Normal] : normal [Tenderness] : nontender [Anteversion] : anteverted [Mass ___ cm] : no uterine mass was palpated [Uterine Adnexae] : normal

## 2024-09-03 LAB — HPV HIGH+LOW RISK DNA PNL CVX: NOT DETECTED

## 2024-09-04 ENCOUNTER — APPOINTMENT (OUTPATIENT)
Dept: BREAST CENTER | Facility: CLINIC | Age: 59
End: 2024-09-04
Payer: COMMERCIAL

## 2024-09-04 VITALS
WEIGHT: 188 LBS | SYSTOLIC BLOOD PRESSURE: 125 MMHG | HEART RATE: 67 BPM | DIASTOLIC BLOOD PRESSURE: 85 MMHG | BODY MASS INDEX: 32.1 KG/M2 | HEIGHT: 64 IN

## 2024-09-04 DIAGNOSIS — Z83.3 FAMILY HISTORY OF DIABETES MELLITUS: ICD-10-CM

## 2024-09-04 DIAGNOSIS — R92.8 OTHER ABNORMAL AND INCONCLUSIVE FINDINGS ON DIAGNOSTIC IMAGING OF BREAST: ICD-10-CM

## 2024-09-04 DIAGNOSIS — Z78.9 OTHER SPECIFIED HEALTH STATUS: ICD-10-CM

## 2024-09-04 PROCEDURE — 99243 OFF/OP CNSLTJ NEW/EST LOW 30: CPT

## 2024-09-04 RX ORDER — TIRZEPATIDE 12.5 MG/.5ML
12.5 INJECTION, SOLUTION SUBCUTANEOUS
Refills: 0 | Status: ACTIVE | COMMUNITY

## 2024-09-04 NOTE — PAST MEDICAL HISTORY
[Menarche Age ____] : age at menarche was [unfilled] [Menopause Age____] : age at menopause was [unfilled] [Approximately ___] : the LMP was approximately [unfilled] [Total Preg ___] : G[unfilled] [Live Births ___] : P[unfilled]  [Age At Live Birth ___] : Age at live birth: [unfilled] [History of Hormone Replacement Treatment] : has no history of hormone replacement treatment [FreeTextEntry7] : yes Pills  [FreeTextEntry8] : no

## 2024-09-04 NOTE — HISTORY OF PRESENT ILLNESS
[FreeTextEntry1] : This is a 59 year old  female who I had seen over 10 years ago.  She had left breast calcifications that were benign on stereotactic biopsy.  She now complains of an abnormal finding on right breast ultraosund.  When she went back for a diagnostic ultrasound, the lesion was no longer seen.  She has no breast symptoms, although there was a slight bruise on her left breast during the exam that she admits to seeing in the last few days.  She doesn't recall any trauma but she has a large dog and grandchildren who may have struck the area.  Risk assessment : ADALBERTO model  10 year 5.7%  Lifetime= 16.1%

## 2024-09-04 NOTE — CONSULT LETTER
[Dear  ___] : Dear  [unfilled], [Consult Letter:] : I had the pleasure of evaluating your patient, [unfilled]. [Sincerely,] : Sincerely, [DrMervin  ___] : Dr. BERNAL

## 2024-09-04 NOTE — DATA REVIEWED
[FreeTextEntry1] : Bilateral mammogram 7/11/2024 ([facility]): No evidence of malignancy.  Bilateral breast ultrasound 7/11/2024: Right 6N3 new 7x4x8 mm hypoechoic structure possibly complicated cyst, rec targeted ultrasound, 10N6 new 6x3x10 mm ovoid cyst.  Right ultrasound 7/16/2024:  Previous mass 6:00 no longer seen, 10N6 7 mm stable cyst  (Not all images were brought.  Images reviewed on the [facility] portal.  The disc was returned to the patient.)

## 2024-09-04 NOTE — PROCEDURE
[FreeTextEntry1] : Bilateral breast ultrasound [FreeTextEntry2] : Assess areas of concern [FreeTextEntry3] : The left 11-12:00 breast show no focal findings.  The right 6:00 breast shows no focal findings.  The left 10N6 breast shows an oval, possible ductal structure.

## 2024-09-04 NOTE — PHYSICAL EXAM
[Bra Size: ___] : Bra Size: [unfilled] [EOMI] : extra ocular movement intact [Sclera nonicteric] : sclera nonicteric [Supple] : supple [No Supraclavicular Adenopathy] : no supraclavicular adenopathy [No Cervical Adenopathy] : no cervical adenopathy [No Thyromegaly] : no thyromegaly [Clear to Auscultation Bilat] : clear to auscultation bilaterally [Normal Sinus Rhythm] : normal sinus rhythm [Normal S1, S2] : normal S1 and S2 [No dominant masses] : no dominant masses in right breast  [No dominant masses] : no dominant masses left breast [No Nipple Retraction] : no left nipple retraction [No Nipple Discharge] : no left nipple discharge [No Axillary Lymphadenopathy] : no left axillary lymphadenopathy [Soft] : abdomen soft [Not Tender] : non-tender [No Palpable Masses] : no abdominal mass palpated [de-identified] : Slight ecchymoses and underlying prominence 11-12:00 N15.

## 2024-09-06 LAB — CYTOLOGY CVX/VAG DOC THIN PREP: NORMAL

## 2024-09-18 ENCOUNTER — TRANSCRIPTION ENCOUNTER (OUTPATIENT)
Age: 59
End: 2024-09-18

## 2025-05-12 NOTE — H&P PST ADULT - ENMT
Patient was advised and in agreement they do not meet Urgent Care criteria based on triage symptoms.    negative No oral lesions; no gross abnormalities

## 2025-09-13 ENCOUNTER — NON-APPOINTMENT (OUTPATIENT)
Age: 60
End: 2025-09-13

## 2025-09-15 DIAGNOSIS — Z12.4 ENCOUNTER FOR SCREENING FOR MALIGNANT NEOPLASM OF CERVIX: ICD-10-CM
